# Patient Record
Sex: FEMALE | Race: BLACK OR AFRICAN AMERICAN | NOT HISPANIC OR LATINO | URBAN - METROPOLITAN AREA
[De-identification: names, ages, dates, MRNs, and addresses within clinical notes are randomized per-mention and may not be internally consistent; named-entity substitution may affect disease eponyms.]

---
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
## 2022-02-28 ENCOUNTER — NON-APPOINTMENT (OUTPATIENT)
Age: 22
End: 2022-02-28

## 2022-02-28 DIAGNOSIS — Z78.9 OTHER SPECIFIED HEALTH STATUS: ICD-10-CM

## 2022-02-28 DIAGNOSIS — Z87.2 PERSONAL HISTORY OF DISEASES OF THE SKIN AND SUBCUTANEOUS TISSUE: ICD-10-CM

## 2022-04-28 ENCOUNTER — APPOINTMENT (OUTPATIENT)
Dept: OBGYN | Facility: CLINIC | Age: 22
End: 2022-04-28
Payer: COMMERCIAL

## 2022-04-28 VITALS
SYSTOLIC BLOOD PRESSURE: 141 MMHG | WEIGHT: 235 LBS | BODY MASS INDEX: 37.77 KG/M2 | HEIGHT: 66 IN | DIASTOLIC BLOOD PRESSURE: 87 MMHG

## 2022-04-28 DIAGNOSIS — E28.2 POLYCYSTIC OVARIAN SYNDROME: ICD-10-CM

## 2022-04-28 PROCEDURE — 99395 PREV VISIT EST AGE 18-39: CPT

## 2022-04-28 NOTE — PLAN
[FreeTextEntry1] : \par - Refill OCP\par - Pt declined breast exam, Pt advised to do a self breast exam\par - RTO 1 year

## 2023-05-10 ENCOUNTER — APPOINTMENT (OUTPATIENT)
Dept: OBGYN | Facility: CLINIC | Age: 23
End: 2023-05-10
Payer: COMMERCIAL

## 2023-05-10 VITALS
SYSTOLIC BLOOD PRESSURE: 132 MMHG | BODY MASS INDEX: 36.96 KG/M2 | DIASTOLIC BLOOD PRESSURE: 90 MMHG | WEIGHT: 230 LBS | HEIGHT: 66 IN

## 2023-05-10 PROCEDURE — 99395 PREV VISIT EST AGE 18-39: CPT

## 2023-05-10 NOTE — END OF VISIT
[FreeTextEntry3] : I, Bekah Peng acted as a scribe on behalf of Dr. Juliet Robles on 05/10/2023.\par \par All medical entries made by the scribe were at my, Dr. Juliet Robles, direction and personally dictated by me on 05/10/2023. I have reviewed the chart and agree that the record accurately reflects my personal performance of the history, physical exam, assessment and plan. I have also personally directed, reviewed, and agreed with the chart.

## 2023-05-10 NOTE — HISTORY OF PRESENT ILLNESS
[FreeTextEntry1] : 23 yo G0 LMP 5/15/23, presents for annual exam. Pt has h/o PCOS, currently on Junel Fe 1.5/30 OCP and doing well on extended cycle dosing regimen. Last seen 4/2022. Pt identifies as gender non-conforming and does not have any preferred pronouns. Denies being sexually active. \par \par She is currently a college student studying biology.\par \par GYNHx: PCOS\par

## 2023-05-10 NOTE — PLAN
[FreeTextEntry1] : 23 yo G0 LMP 5/15/23, annual exam.\par \par HCM\par -continue with Junel Fe 1.50/30 on extended cycle dosing regimen \par -discussed pap at age 23\par -vit D/calcium/exercise\par -f/u PCP for appropriate immunizations \par -rto 1 year for annual

## 2023-06-19 NOTE — H&P PEDIATRIC - ATTENDING COMMENTS
Immediate family member
Patient is a 17 y/o female with history of anemia who presents with severe anemia secondary to heavy vaginal bleeding x 3 weeks and associated weakness. Patient has a history of anemia for which she was given iron last year by her pediatrician for approximately one month. She was started on minocycline in 2017 by her dermatologist for acne and acanthosis nigrans. Due to subsequent headache, vomiting, and decreased appetite, she stopped taking the minocycline in mid-October, exact date is unknown. She was seen by opthalmology with no further concerns. For the past two weeks prior to admission, the patient has been feeling more weak and fatigued. She has been menstruating for the past 3 weeks going through initially up to 3-4 pads per day, now currently using 1-2 pads per day. Due to continued weakness and prolonged menstruation, patient went to PMD one day prior to admission and noted to have a Hgb count of 4.3 mg/dl and HCT of 12.6. She was then sent to ED for severe anemia. No epistaxis, no bleeding with teeth brushing, no easy bruising, and no h/o bleeding problems. She denies dysuria, hematuria, vaginal discharge, chest pain, and SOB. Menarche was at age 12. Prior to minocycline, patient had regular periods each month lasting 7 days. She was previously using 1 pad per day while menstruating.     In the ED, the patient was noted to have Hb of 3.7 and received 3 units of packed red blood cells. Repeat CBCs were done, with most recent CBC showing Hb of 6.2. OB/GYN and Heme/Onc were consulted. UA showed large blood, but negative for UTI. VWF and coags were sent which are pending. Patient refused a pelvic exam; however, pelvic US was obtained which was normal. Patient was transferred to the floor in stable condition and currently feels less tired with continued vaginal bleeding.     PMH: Anemia, acathosis nigrans, acne.   Birth History: Born full term via  with no complications  Developmental History: Met all milestones on time per mother, no additional services needed at school  Allergies: No known drug or food allergies.   Medications: Currently not on any medications.  PSHx: No surgical history    FH: Maternal history of anemia. No further family history of bleeding disorders, cardiac history, stroke, easy bruising or miscarriages  SH: Patient lives at home with mother and no pets or smoking exposure. Eats regular diet with no dietary restrictions.     HEADSSS: Patient lives at home with mother and feels close to mom. She attends 10th grade and is getting A's. Her favorite subjects are math and science and she aspires to become an ER physician. She was previously bullied when younger, but is no longer bullied. She attends gym at school for exercise, otherwise no other activities. She enjoys eating and has been eating out more in [place]. She eats all foods including red meat without any issues. She denies tobacco, alcohol, and drug use. She is not in relationship and denies ever having any sexual activity. She is not concerned that she may be pregnant. She denies suicidal ideations and homicidal ideations. She feels safe at home.   Assessment : menorrhagia , anemia  Plan :  1- Transfuse 4 units of prbcs as planned as per heme and gyne consult.  2- ocp as per obgyn.  3- i/v iron as per heme.

## 2024-05-24 ENCOUNTER — APPOINTMENT (OUTPATIENT)
Dept: OBGYN | Facility: CLINIC | Age: 24
End: 2024-05-24
Payer: COMMERCIAL

## 2024-05-24 VITALS
DIASTOLIC BLOOD PRESSURE: 88 MMHG | SYSTOLIC BLOOD PRESSURE: 129 MMHG | BODY MASS INDEX: 39.86 KG/M2 | WEIGHT: 248 LBS | HEIGHT: 66 IN

## 2024-05-24 DIAGNOSIS — Z01.419 ENCOUNTER FOR GYNECOLOGICAL EXAMINATION (GENERAL) (ROUTINE) W/OUT ABNORMAL FINDINGS: ICD-10-CM

## 2024-05-24 PROCEDURE — 99395 PREV VISIT EST AGE 18-39: CPT

## 2024-05-24 RX ORDER — NORETHINDRONE ACETATE AND ETHINYL ESTRADIOL AND FERROUS FUMARATE 1.5-30(21)
1.5-3 KIT ORAL
Qty: 3 | Refills: 4 | Status: ACTIVE | COMMUNITY
Start: 2018-07-05 | End: 1900-01-01

## 2024-05-26 PROBLEM — Z01.419 WOMEN'S ANNUAL ROUTINE GYNECOLOGICAL EXAMINATION: Status: ACTIVE | Noted: 2022-04-28

## 2024-05-26 NOTE — END OF VISIT
[FreeTextEntry3] : I, Eugenie Story, acted solely as a scribe for Dr. Juliet Robles, on 05/24/2024.   All medical record entries made by the scribe were at my, Dr. Juliet Robles., direction and personally dictated by me on 05/24/2024. I have personally reviewed the chart and agree that the record accurately reflects my personal performance of the history, physical exam, assessment and plan.

## 2024-05-26 NOTE — PLAN
[FreeTextEntry1] : MASOUD SAVAGE is a 23 year old presenting for annual GYN exam.  -Pt declines pelvic exam/Pap; reconsider pap next year -refill OCP   RTO 1 yr for annual

## 2024-05-26 NOTE — HISTORY OF PRESENT ILLNESS
[FreeTextEntry1] : MASOUD SAVAGE is a 23 year old presenting for annual GYN exam. Last seen may 2023. Pt has h/o PCOS, currently on Junel Fe 1.5/30 OCP and doing well on extended cycle dosing regimen. Pt identifies as gender non-conforming and does not have any preferred pronouns. Denies being sexually active.  Pt will be going to several concerts this summer. graduating from [employer], will be starting at [employer]   GYNHx: PCOS, gardasil vaccine

## 2025-08-06 ENCOUNTER — NON-APPOINTMENT (OUTPATIENT)
Age: 25
End: 2025-08-06

## 2025-08-07 ENCOUNTER — APPOINTMENT (OUTPATIENT)
Dept: OBGYN | Facility: CLINIC | Age: 25
End: 2025-08-07
Payer: COMMERCIAL

## 2025-08-07 VITALS
WEIGHT: 227 LBS | BODY MASS INDEX: 36.48 KG/M2 | SYSTOLIC BLOOD PRESSURE: 141 MMHG | HEIGHT: 66 IN | DIASTOLIC BLOOD PRESSURE: 90 MMHG

## 2025-08-07 DIAGNOSIS — Z01.419 ENCOUNTER FOR GYNECOLOGICAL EXAMINATION (GENERAL) (ROUTINE) W/OUT ABNORMAL FINDINGS: ICD-10-CM

## 2025-08-07 PROCEDURE — 99395 PREV VISIT EST AGE 18-39: CPT

## 2025-08-08 ENCOUNTER — NON-APPOINTMENT (OUTPATIENT)
Age: 25
End: 2025-08-08